# Patient Record
Sex: FEMALE | URBAN - METROPOLITAN AREA
[De-identification: names, ages, dates, MRNs, and addresses within clinical notes are randomized per-mention and may not be internally consistent; named-entity substitution may affect disease eponyms.]

---

## 2023-04-10 ENCOUNTER — OFFICE VISIT (OUTPATIENT)
Dept: URBAN - METROPOLITAN AREA CLINIC 71 | Facility: CLINIC | Age: 63
End: 2023-04-10
Payer: MEDICARE

## 2023-04-10 DIAGNOSIS — H04.123 DRY EYE SYNDROME OF BILATERAL LACRIMAL GLANDS: Primary | ICD-10-CM

## 2023-04-10 DIAGNOSIS — H43.393 OTHER VITREOUS OPACITIES, BILATERAL: ICD-10-CM

## 2023-04-10 DIAGNOSIS — Z96.1 PRESENCE OF INTRAOCULAR LENS: ICD-10-CM

## 2023-04-10 PROCEDURE — 99203 OFFICE O/P NEW LOW 30 MIN: CPT | Performed by: OPHTHALMOLOGY

## 2023-04-10 PROCEDURE — 92133 CPTRZD OPH DX IMG PST SGM ON: CPT | Performed by: OPHTHALMOLOGY

## 2023-04-10 ASSESSMENT — INTRAOCULAR PRESSURE
OD: 16
OS: 16
OS: 20
OD: 17

## 2023-04-10 NOTE — IMPRESSION/PLAN
Impression: Presence of intraocular lens: Z96.1. Stable. Discussed the HX of glaucoma she had was likely narrow angle glaucoma which would explain why the surgeon decided to remove the cataracts. Optic nerves appear stable and healthy.    Plan: Observe

## 2023-04-10 NOTE — IMPRESSION/PLAN
Impression: Other vitreous opacities, bilateral: H43.393. PT reports the grey matter she sees in the left eye interferes with her vision. Discussed this is likely due to vitreous floaters which become more apparent post cataract surgery. Discussed the option a retinal specialist evolution for further observation but PT declines. She will try other treatments and let us know if she wants a referral to retina. Plan: OU: Discussed diagnosis in detail with patient. No treatment is required at this time. Will continue to observe condition and or symptoms. Patient instructed to call if condition gets worse.

## 2023-04-11 ENCOUNTER — TESTING ONLY (OUTPATIENT)
Dept: URBAN - METROPOLITAN AREA CLINIC 71 | Facility: CLINIC | Age: 63
End: 2023-04-11
Payer: MEDICARE

## 2023-04-11 DIAGNOSIS — H52.4 PRESBYOPIA: Primary | ICD-10-CM

## 2023-04-11 DIAGNOSIS — H52.03 HYPERMETROPIA, BILATERAL: ICD-10-CM

## 2023-04-11 DIAGNOSIS — H26.493 OTHER SECONDARY CATARACT, BILATERAL: ICD-10-CM

## 2023-04-11 ASSESSMENT — VISUAL ACUITY
OD: 20/20
OS: 20/20

## 2023-04-11 ASSESSMENT — INTRAOCULAR PRESSURE
OD: 11
OS: 12

## 2023-04-11 NOTE — IMPRESSION/PLAN
Impression: Presbyopia: H52.4.

 -- Dispensed 2 glasses prescription: Distance and near Plan: Finalized and dispensed glasses prescription. Explained that it will take about 2-3 weeks to get adjusted to the glasses and to RTC immediately if any issues with the eyes happen or if any issues with the glasses prescription occurs. Patient expressed understanding.

## 2023-04-11 NOTE — IMPRESSION/PLAN
Impression: Other secondary cataract, bilateral: H26.493.

 -- OS>OD Plan: Discussed the nature of the diagnosis with the patient. Explained that there is scar tissue that has grown on the back of the implant however it is not visually significant. Talked about the YAG treatment option with the patient but not recommended at this time. Will continue to monitor. Instructed patient to RTC in 6 months for DFE and cap check OU or if any issues or complications arise. Patient expressed understanding.

## 2023-05-30 ENCOUNTER — OFFICE VISIT (OUTPATIENT)
Dept: URBAN - METROPOLITAN AREA CLINIC 71 | Facility: CLINIC | Age: 63
End: 2023-05-30
Payer: MEDICARE

## 2023-05-30 DIAGNOSIS — Z96.1 PRESENCE OF INTRAOCULAR LENS: ICD-10-CM

## 2023-05-30 DIAGNOSIS — H26.493 OTHER SECONDARY CATARACT, BILATERAL: Primary | ICD-10-CM

## 2023-05-30 PROCEDURE — 99214 OFFICE O/P EST MOD 30 MIN: CPT | Performed by: OPHTHALMOLOGY

## 2023-05-30 ASSESSMENT — INTRAOCULAR PRESSURE
OS: 16
OD: 17

## 2023-05-30 ASSESSMENT — VISUAL ACUITY
OD: 20/20
OS: 20/25

## 2023-05-30 ASSESSMENT — KERATOMETRY
OD: 46.25
OS: 46.63

## 2023-05-30 NOTE — IMPRESSION/PLAN
Impression: Other secondary cataract, bilateral: H26.493. PCO noted OU. PT reports subjective visual changes and a constant haze in the left eye that is not improving with updated glasses. Recommend YAG CAP OS only. Plan: PCO accounts for the patient's complaints of hazy vision OS. Discussed all risks, benefits, procedures and recovery. Patient understands changing glasses will not improve vision.   Patient desires to have surgery, recommend YAG CAP OS ONLY

## 2023-07-27 ENCOUNTER — SURGERY (OUTPATIENT)
Dept: URBAN - METROPOLITAN AREA SURGERY 44 | Facility: SURGERY | Age: 63
End: 2023-07-27
Payer: MEDICARE

## 2023-07-27 ENCOUNTER — SURGERY (OUTPATIENT)
Dept: URBAN - METROPOLITAN AREA SURGERY 45 | Facility: SURGERY | Age: 63
End: 2023-07-27
Payer: MEDICARE

## 2023-07-27 PROCEDURE — 66821 AFTER CATARACT LASER SURGERY: CPT | Performed by: OPHTHALMOLOGY

## 2023-08-02 ENCOUNTER — POST-OPERATIVE VISIT (OUTPATIENT)
Dept: URBAN - METROPOLITAN AREA CLINIC 71 | Facility: CLINIC | Age: 63
End: 2023-08-02
Payer: MEDICARE

## 2023-08-02 DIAGNOSIS — Z48.810 ENCOUNTER FOR SURGICAL AFTERCARE FOLLOWING SURGERY ON A SENSE ORGAN: Primary | ICD-10-CM

## 2023-08-02 PROCEDURE — 99024 POSTOP FOLLOW-UP VISIT: CPT | Performed by: OPHTHALMOLOGY

## 2023-08-02 ASSESSMENT — INTRAOCULAR PRESSURE
OS: 16
OD: 15

## 2024-02-06 ENCOUNTER — OFFICE VISIT (OUTPATIENT)
Dept: URBAN - METROPOLITAN AREA CLINIC 71 | Facility: CLINIC | Age: 64
End: 2024-02-06
Payer: MEDICARE

## 2024-02-06 DIAGNOSIS — H26.491 OTHER SECONDARY CATARACT, RIGHT EYE: Primary | ICD-10-CM

## 2024-02-06 DIAGNOSIS — H04.123 DRY EYE SYNDROME OF BILATERAL LACRIMAL GLANDS: ICD-10-CM

## 2024-02-06 PROCEDURE — 99213 OFFICE O/P EST LOW 20 MIN: CPT | Performed by: OPHTHALMOLOGY

## 2024-02-06 ASSESSMENT — INTRAOCULAR PRESSURE
OS: 18
OD: 15

## 2024-04-19 ENCOUNTER — OFFICE VISIT (OUTPATIENT)
Dept: URBAN - METROPOLITAN AREA CLINIC 71 | Facility: CLINIC | Age: 64
End: 2024-04-19

## 2024-04-19 DIAGNOSIS — H52.03 HYPERMETROPIA, BILATERAL: Primary | ICD-10-CM

## 2024-04-19 ASSESSMENT — VISUAL ACUITY
OS: 20/20
OD: 20/20

## 2024-04-19 ASSESSMENT — INTRAOCULAR PRESSURE
OD: 15
OS: 15

## 2025-04-01 ENCOUNTER — OFFICE VISIT (OUTPATIENT)
Dept: URBAN - METROPOLITAN AREA CLINIC 71 | Facility: CLINIC | Age: 65
End: 2025-04-01
Payer: MEDICARE

## 2025-04-01 DIAGNOSIS — H26.491 OTHER SECONDARY CATARACT, RIGHT EYE: ICD-10-CM

## 2025-04-01 DIAGNOSIS — Z96.1 PRESENCE OF INTRAOCULAR LENS: Primary | ICD-10-CM

## 2025-04-01 DIAGNOSIS — H04.123 DRY EYE SYNDROME OF BILATERAL LACRIMAL GLANDS: ICD-10-CM

## 2025-04-01 PROCEDURE — 99213 OFFICE O/P EST LOW 20 MIN: CPT | Performed by: OPHTHALMOLOGY

## 2025-04-01 ASSESSMENT — INTRAOCULAR PRESSURE
OS: 13
OD: 13

## 2025-04-18 ENCOUNTER — OFFICE VISIT (OUTPATIENT)
Dept: URBAN - METROPOLITAN AREA CLINIC 71 | Facility: CLINIC | Age: 65
End: 2025-04-18

## 2025-04-18 DIAGNOSIS — H52.4 PRESBYOPIA: Primary | ICD-10-CM

## 2025-04-18 PROCEDURE — 92310 CONTACT LENS FITTING OU: CPT

## 2025-04-18 ASSESSMENT — VISUAL ACUITY
OD: 20/20
OS: 20/20

## 2025-04-18 ASSESSMENT — INTRAOCULAR PRESSURE
OD: 18
OS: 18

## 2025-06-11 ENCOUNTER — OFFICE VISIT (OUTPATIENT)
Dept: URBAN - METROPOLITAN AREA CLINIC 71 | Facility: CLINIC | Age: 65
End: 2025-06-11
Payer: MEDICARE

## 2025-06-11 DIAGNOSIS — Z96.1 PRESENCE OF INTRAOCULAR LENS: ICD-10-CM

## 2025-06-11 DIAGNOSIS — G43.109 MIGRAINE WITH AURA: Primary | ICD-10-CM

## 2025-06-11 PROCEDURE — 99213 OFFICE O/P EST LOW 20 MIN: CPT | Performed by: OPHTHALMOLOGY

## 2025-06-11 ASSESSMENT — INTRAOCULAR PRESSURE
OD: 14
OS: 15